# Patient Record
Sex: MALE | Race: BLACK OR AFRICAN AMERICAN | URBAN - METROPOLITAN AREA
[De-identification: names, ages, dates, MRNs, and addresses within clinical notes are randomized per-mention and may not be internally consistent; named-entity substitution may affect disease eponyms.]

---

## 2023-11-14 ENCOUNTER — OFFICE (OUTPATIENT)
Dept: URBAN - METROPOLITAN AREA CLINIC 19 | Facility: CLINIC | Age: 53
End: 2023-11-14

## 2023-11-14 VITALS
HEART RATE: 114 BPM | HEIGHT: 69 IN | DIASTOLIC BLOOD PRESSURE: 105 MMHG | OXYGEN SATURATION: 99 % | SYSTOLIC BLOOD PRESSURE: 169 MMHG | WEIGHT: 161 LBS

## 2023-11-14 DIAGNOSIS — R10.84 GENERALIZED ABDOMINAL PAIN: ICD-10-CM

## 2023-11-14 DIAGNOSIS — R74.01 ELEVATION OF LEVELS OF LIVER TRANSAMINASE LEVELS: ICD-10-CM

## 2023-11-14 DIAGNOSIS — R68.81 EARLY SATIETY: ICD-10-CM

## 2023-11-14 DIAGNOSIS — R63.4 ABNORMAL WEIGHT LOSS: ICD-10-CM

## 2023-11-14 DIAGNOSIS — R74.8 ABNORMAL LEVELS OF OTHER SERUM ENZYMES: ICD-10-CM

## 2023-11-14 DIAGNOSIS — R68.2 DRY MOUTH, UNSPECIFIED: ICD-10-CM

## 2023-11-14 DIAGNOSIS — Z86.39 PERSONAL HISTORY OF OTHER ENDOCRINE, NUTRITIONAL AND METABOL: ICD-10-CM

## 2023-11-14 DIAGNOSIS — R11.2 NAUSEA WITH VOMITING, UNSPECIFIED: ICD-10-CM

## 2023-11-14 PROCEDURE — 99204 OFFICE O/P NEW MOD 45 MIN: CPT

## 2023-11-14 RX ORDER — FAMOTIDINE 40 MG/1
TABLET, FILM COATED ORAL
Qty: 60 | Refills: 5 | Status: COMPLETED
Start: 2023-11-14 | End: 2023-12-01

## 2023-11-15 LAB
AMYLASE: 67 U/L (ref 31–110)
CBC, PLATELET, NO DIFFERENTIAL: HEMATOCRIT: 38.9 % (ref 37.5–51)
CBC, PLATELET, NO DIFFERENTIAL: HEMOGLOBIN: 12.8 G/DL — LOW (ref 13–17.7)
CBC, PLATELET, NO DIFFERENTIAL: MCH: 27.8 PG (ref 26.6–33)
CBC, PLATELET, NO DIFFERENTIAL: MCHC: 32.9 G/DL (ref 31.5–35.7)
CBC, PLATELET, NO DIFFERENTIAL: MCV: 85 FL (ref 79–97)
CBC, PLATELET, NO DIFFERENTIAL: PLATELETS: 398 X10E3/UL (ref 150–450)
CBC, PLATELET, NO DIFFERENTIAL: RBC: 4.6 X10E6/UL (ref 4.14–5.8)
CBC, PLATELET, NO DIFFERENTIAL: RDW: 14 % (ref 11.6–15.4)
CBC, PLATELET, NO DIFFERENTIAL: WBC: 4.7 X10E3/UL (ref 3.4–10.8)
COMP. METABOLIC PANEL (14): A/G RATIO: 1.2 (ref 1.2–2.2)
COMP. METABOLIC PANEL (14): ALBUMIN: 3.9 G/DL (ref 3.8–4.9)
COMP. METABOLIC PANEL (14): ALKALINE PHOSPHATASE: 616 IU/L — HIGH (ref 44–121)
COMP. METABOLIC PANEL (14): ALT (SGPT): 120 IU/L — HIGH (ref 0–44)
COMP. METABOLIC PANEL (14): AST (SGOT): 77 IU/L — HIGH (ref 0–40)
COMP. METABOLIC PANEL (14): BILIRUBIN, TOTAL: 0.8 MG/DL (ref 0–1.2)
COMP. METABOLIC PANEL (14): BUN/CREATININE RATIO: 15 (ref 9–20)
COMP. METABOLIC PANEL (14): BUN: 24 MG/DL (ref 6–24)
COMP. METABOLIC PANEL (14): CALCIUM: 10.7 MG/DL — HIGH (ref 8.7–10.2)
COMP. METABOLIC PANEL (14): CARBON DIOXIDE, TOTAL: 27 MMOL/L (ref 20–29)
COMP. METABOLIC PANEL (14): CHLORIDE: 92 MMOL/L — LOW (ref 96–106)
COMP. METABOLIC PANEL (14): CREATININE: 1.56 MG/DL — HIGH (ref 0.76–1.27)
COMP. METABOLIC PANEL (14): EGFR: 53 ML/MIN/1.73 — LOW (ref 59–?)
COMP. METABOLIC PANEL (14): GLOBULIN, TOTAL: 3.2 G/DL (ref 1.5–4.5)
COMP. METABOLIC PANEL (14): GLUCOSE: 153 MG/DL — HIGH (ref 70–99)
COMP. METABOLIC PANEL (14): POTASSIUM: 4.2 MMOL/L (ref 3.5–5.2)
COMP. METABOLIC PANEL (14): PROTEIN, TOTAL: 7.1 G/DL (ref 6–8.5)
COMP. METABOLIC PANEL (14): SODIUM: 135 MMOL/L (ref 134–144)
LIPASE: 57 U/L (ref 13–78)
SJOGREN'S AB, ANTI-SS-A/-SS-B: SJOGREN'S ANTI-SS-A: <0.2 AI
SJOGREN'S AB, ANTI-SS-A/-SS-B: SJOGREN'S ANTI-SS-B: <0.2 AI
TSH: 1.59 UIU/ML (ref 0.45–4.5)

## 2023-12-01 ENCOUNTER — OFFICE (OUTPATIENT)
Dept: URBAN - METROPOLITAN AREA CLINIC 19 | Facility: CLINIC | Age: 53
End: 2023-12-01

## 2023-12-01 VITALS
HEART RATE: 116 BPM | OXYGEN SATURATION: 100 % | SYSTOLIC BLOOD PRESSURE: 152 MMHG | WEIGHT: 158 LBS | DIASTOLIC BLOOD PRESSURE: 99 MMHG | HEIGHT: 69 IN

## 2023-12-01 DIAGNOSIS — Z86.39 PERSONAL HISTORY OF OTHER ENDOCRINE, NUTRITIONAL AND METABOL: ICD-10-CM

## 2023-12-01 DIAGNOSIS — R10.13 EPIGASTRIC PAIN: ICD-10-CM

## 2023-12-01 DIAGNOSIS — R74.8 ABNORMAL LEVELS OF OTHER SERUM ENZYMES: ICD-10-CM

## 2023-12-01 DIAGNOSIS — R68.81 EARLY SATIETY: ICD-10-CM

## 2023-12-01 DIAGNOSIS — R11.2 NAUSEA WITH VOMITING, UNSPECIFIED: ICD-10-CM

## 2023-12-01 DIAGNOSIS — R74.01 ELEVATION OF LEVELS OF LIVER TRANSAMINASE LEVELS: ICD-10-CM

## 2023-12-01 PROCEDURE — 99214 OFFICE O/P EST MOD 30 MIN: CPT

## 2023-12-01 RX ORDER — LANSOPRAZOLE 30 MG/1
CAPSULE, DELAYED RELEASE PELLETS ORAL
Qty: 60 | Refills: 5 | Status: ACTIVE
Start: 2023-12-01

## 2023-12-01 RX ORDER — SUCRALFATE 1 G/1
2 TABLET ORAL
Qty: 20 | Refills: 0 | Status: COMPLETED
End: 2023-12-01

## 2023-12-03 ENCOUNTER — INPATIENT HOSPITAL (OUTPATIENT)
Dept: URBAN - METROPOLITAN AREA HOSPITAL 130 | Facility: HOSPITAL | Age: 53
End: 2023-12-03
Payer: COMMERCIAL

## 2023-12-03 DIAGNOSIS — R10.10 UPPER ABDOMINAL PAIN, UNSPECIFIED: ICD-10-CM

## 2023-12-03 DIAGNOSIS — R63.4 ABNORMAL WEIGHT LOSS: ICD-10-CM

## 2023-12-03 DIAGNOSIS — R93.3 ABNORMAL FINDINGS ON DIAGNOSTIC IMAGING OF OTHER PARTS OF DI: ICD-10-CM

## 2023-12-03 DIAGNOSIS — R11.2 NAUSEA WITH VOMITING, UNSPECIFIED: ICD-10-CM

## 2023-12-03 PROCEDURE — 99221 1ST HOSP IP/OBS SF/LOW 40: CPT | Performed by: INTERNAL MEDICINE

## 2023-12-04 ENCOUNTER — INPATIENT HOSPITAL (OUTPATIENT)
Dept: URBAN - METROPOLITAN AREA HOSPITAL 130 | Facility: HOSPITAL | Age: 53
End: 2023-12-04
Payer: COMMERCIAL

## 2023-12-04 DIAGNOSIS — K31.84 GASTROPARESIS: ICD-10-CM

## 2023-12-04 DIAGNOSIS — R93.3 ABNORMAL FINDINGS ON DIAGNOSTIC IMAGING OF OTHER PARTS OF DI: ICD-10-CM

## 2023-12-04 DIAGNOSIS — R11.2 NAUSEA WITH VOMITING, UNSPECIFIED: ICD-10-CM

## 2023-12-04 PROCEDURE — 99233 SBSQ HOSP IP/OBS HIGH 50: CPT | Performed by: INTERNAL MEDICINE

## 2023-12-05 ENCOUNTER — INPATIENT HOSPITAL (OUTPATIENT)
Dept: URBAN - METROPOLITAN AREA HOSPITAL 130 | Facility: HOSPITAL | Age: 53
End: 2023-12-05

## 2023-12-05 DIAGNOSIS — R93.3 ABNORMAL FINDINGS ON DIAGNOSTIC IMAGING OF OTHER PARTS OF DI: ICD-10-CM

## 2023-12-05 DIAGNOSIS — R10.10 UPPER ABDOMINAL PAIN, UNSPECIFIED: ICD-10-CM

## 2023-12-05 DIAGNOSIS — K31.84 GASTROPARESIS: ICD-10-CM

## 2023-12-05 DIAGNOSIS — R11.2 NAUSEA WITH VOMITING, UNSPECIFIED: ICD-10-CM

## 2023-12-05 PROCEDURE — 99232 SBSQ HOSP IP/OBS MODERATE 35: CPT | Performed by: INTERNAL MEDICINE

## 2023-12-07 ENCOUNTER — INPATIENT HOSPITAL (OUTPATIENT)
Dept: URBAN - METROPOLITAN AREA HOSPITAL 130 | Facility: HOSPITAL | Age: 53
End: 2023-12-07
Payer: COMMERCIAL

## 2023-12-07 DIAGNOSIS — K31.84 GASTROPARESIS: ICD-10-CM

## 2023-12-07 DIAGNOSIS — R93.3 ABNORMAL FINDINGS ON DIAGNOSTIC IMAGING OF OTHER PARTS OF DI: ICD-10-CM

## 2023-12-07 DIAGNOSIS — R11.2 NAUSEA WITH VOMITING, UNSPECIFIED: ICD-10-CM

## 2023-12-07 DIAGNOSIS — R10.10 UPPER ABDOMINAL PAIN, UNSPECIFIED: ICD-10-CM

## 2023-12-07 PROCEDURE — 99232 SBSQ HOSP IP/OBS MODERATE 35: CPT | Performed by: INTERNAL MEDICINE

## 2023-12-21 ENCOUNTER — OFFICE (OUTPATIENT)
Dept: URBAN - METROPOLITAN AREA CLINIC 19 | Facility: CLINIC | Age: 53
End: 2023-12-21
Payer: COMMERCIAL

## 2023-12-21 VITALS
DIASTOLIC BLOOD PRESSURE: 105 MMHG | SYSTOLIC BLOOD PRESSURE: 171 MMHG | HEART RATE: 85 BPM | WEIGHT: 149 LBS | OXYGEN SATURATION: 99 % | HEIGHT: 69 IN

## 2023-12-21 DIAGNOSIS — K31.84 GASTROPARESIS: ICD-10-CM

## 2023-12-21 DIAGNOSIS — R10.13 EPIGASTRIC PAIN: ICD-10-CM

## 2023-12-21 DIAGNOSIS — Z86.39 PERSONAL HISTORY OF OTHER ENDOCRINE, NUTRITIONAL AND METABOL: ICD-10-CM

## 2023-12-21 DIAGNOSIS — R74.01 ELEVATION OF LEVELS OF LIVER TRANSAMINASE LEVELS: ICD-10-CM

## 2023-12-21 DIAGNOSIS — D86.9 SARCOIDOSIS, UNSPECIFIED: ICD-10-CM

## 2023-12-21 DIAGNOSIS — R74.8 ABNORMAL LEVELS OF OTHER SERUM ENZYMES: ICD-10-CM

## 2023-12-21 PROCEDURE — 99214 OFFICE O/P EST MOD 30 MIN: CPT

## 2023-12-21 RX ORDER — METOCLOPRAMIDE 10 MG/1
10 TABLET ORAL
Qty: 90 | Refills: 6 | Status: ACTIVE

## 2023-12-21 NOTE — SERVICEHPINOTES
52-year-old male is here post hospital discharge follow up.   Evaluated at [facility] on 12/04/2023 for abdominal pain.  Gastric emptying test revealed  96% retention at 2 hours.   CT A/P without contrast revealed innumerable nodules throughout the lungs bilaterally with the largest measuring 6.3 mm in the right lobe.  Enlarged prostate.   Normal liver, gallbladder, spleen and pancreas were noted. No acute abnormalities were noted in the abdomen or pelvis.  CT chest without contrast revealed numerous scattered pulmonary lymph nodes, predominantly perilymphatic distribution with prominent pillar and mediastinal adenopathy which could be seen in the setting of sarcoidosis. Labs revealed creatinine 1.56,   Alkaline phosphatase 340, AST 43, AL T 66.  A1c 7.4.  Normal TSH.  Hemoglobin 12.7, hematocrit 37.9. He was started on Reglan 10 mg 3 times daily which has significantly improved his nausea, vomiting and abdominal pain.  He is requesting a refill.  Outside progress note from pulmonologist was reviewed.  He is following with Jackeline Silva NP at [facility], [place].  He is currently taking prednisone 10 mg 3 times a day for 4 weeks with taper.  Taking lansoprazole twice daily which is improving reflux.  His  bowel movements are regular.  Denies any rectal bleeding or hematemesis.  He seems to be doing better since post hospital discharge, however he very discouraged with this diagnosis and multiple hospitalizations.br
brLabs on 12/01/2023 revealed ferritin 513 and iron saturation was 38.  alkaline phosphatase 439. Alkaline phosphatase isoenzymes were normal.  LUCERO negative.  Normal SMA and mitochondrial antibody.  Gunnar Barr IgM and CMV IgM were negative.  Ceruloplasmin 33.5.   Alpha 1 antitrypsin phenotype mm.br
br Evaluated at [facility] in [place] on 11/27/23 for epigastric abdominal pain.  Abdominal ultrasound revealed normal gallbladder without pericholecystic fluid or gallbladder wall thickening or gallstones.  Normal liver and no lesion noted.   CT A/P with contrast revealed normal liver, spleen, and gallbladder.  5 mm cyst within the pancreatic head was noted. Large right paraoesophageal and perihilar lymphadenopathy/ nodularity with additional smaller pulmonary parenchymal nodules, measuring up to 7 mm. MRI abdomen with and without contrast revealed normal pancreas, liver, spleen and gallbladder. Several paraesophageal lymph nodes with the largest measuring 9.4 mm. Small left renal cyst measuring 3 mm. EGD revealed normal duodenum and esophagus, erythema in the antrum consistent with moderate to severe gastritis.  Biopsies revealed reactive gastropathy and were negative for H pylori. Labs at that time revealed normal magnesium, phosphorus, and CBC.   Alkaline phosphatase 379, , AST 71.History of diabetes and chronic kidney disease.  Does not take any NSAIDs.  He informs me he had a colonoscopy 2 years ago with Dr. Daniel Gatica. I do not have access to these records at this time. No family history of colon cancer, colon polyps, or IBD.Outside labs from September 2023 revealed alkaline phosphatase 573, AST 78,  .  A1c 8.5.  Hepatitis A IgM negative.  Hepatitis-B IgM and surface antigen negative.  Hepatitis-C antibody negative.  Patient does not drink any alcohol.  No history of IV drug use and does not smoke marijuana or vape.  No family history of liver disease or liver cancer. Taking simvastatin daily.

## 2023-12-23 LAB
ANGIOTENSIN-CONVERTING ENZYME: ACE, SERUM: 7 U/L — LOW (ref 14–82)
PROTHROMBIN TIME (PT): INR: 1 (ref 0.9–1.2)
PROTHROMBIN TIME (PT): PROTHROMBIN TIME: 10.2 SEC (ref 9.1–12)